# Patient Record
Sex: MALE | URBAN - METROPOLITAN AREA
[De-identification: names, ages, dates, MRNs, and addresses within clinical notes are randomized per-mention and may not be internally consistent; named-entity substitution may affect disease eponyms.]

---

## 2024-01-10 DIAGNOSIS — B20 HUMAN IMMUNODEFICIENCY VIRUS (HIV) DISEASE: Primary | ICD-10-CM

## 2024-01-11 ENCOUNTER — TELEPHONE (OUTPATIENT)
Dept: INFECTIOUS DISEASES | Facility: CLINIC | Age: 39
End: 2024-01-11
Payer: MEDICAID

## 2024-01-11 NOTE — LETTER
Fax Transmission                                                                                                                                                       Date: January 11, 2024       To:  Chelle [facility] From: Azeb Veras LPN             HIV Care Coordinator    Fax: [phone]  Fax: [phone]   Phone: [phone]  Phone: [phone]     Special Instructions:     Chelle,   I have scheduled the intake appointment for Terry Puentes, as you requested.   His appointment is January 23, 2024 @ 9:00 am at:     [facility]   Specialty Care Clinic, 4th floor  [address] [place] [address]    If you need to cancel or reschedule this appointment for any reason, just contact me and I will be happy to work with you to accommodate this patient.    For questions or issues, please contact department listed on attached report.                            IF THERE ARE ANY PROBLEMS WITH THIS TRANSMISSION, PLEASE CALL IMMEDIATELY. THANK YOU    CONFIDENTIALITY NOTICE: The accompanying facsimile is intended solely for the use of the recipient designated above. Document(s) transmitted herewith may contain information that is confidential and privileged. Delivery, distribution of dissemination of this communication other than to the intended recipient is strictly prohibited. If you are another healthcare provider and have received this facsimile in error, please properly dispose and notify the sender. If you are NOT a healthcare provider and have received this facsimile in error, please notify [facility]s Compliance & Privacy Department immediately by email at [email or web address]

## 2024-01-11 NOTE — TELEPHONE ENCOUNTER
I received a fax from Chelle Hurt with [place] [employer] [facility] requesting an appt for Terry Puentes who is being released from [facility] on 1/15/24. She is requesting an intake appt for this pt to give to him prior to his release. Appt info left on her voicemail and faxed to her, as well with my contact info to reach me if there are any questions or if she needs further assistance.     Appt scheduled 1/23/2024 @ 9:00a.

## 2024-01-30 ENCOUNTER — DOCUMENTATION ONLY (OUTPATIENT)
Dept: INFECTIOUS DISEASES | Facility: CLINIC | Age: 39
End: 2024-01-30
Payer: MEDICAID

## 2024-01-30 NOTE — LETTER
February 2, 2024             [facility]  [address]  [place] [address]  Phone: [phone] To: [facility]        Office of Public Health          On 1/10/24, I received a referral from Chelle Hurt, requesting an intake for this inmate who was scheduled to be released on 1/15/24.   On 1/11/24, I scheduled the intake appointment for 1/23/24 @ 09:00. I attempted to call Chelle with the appointment information, but was unsuccessful in doing so, as there was no answer and her voicemail box was full. I faxed the information to her as she requested on the referral that was sent. I did receive a successful transmission of this fax. On 1/23/24, The patient did not show up for his intake appointment. I have tried to call Chelle twice daily since 1/23/24 in hopes of obtaining contact info on this patient, to no avail, no answer and voicemail box was full.   On 1/30/24, I reviewed his records in [facility]'s Care Everywhere and found that he had a telemed visit with half-way physician, Dr. Dg Silva, on 1/4/2024 @ 09:30 where he was given Triumeq with a quantity of 30 with 11 refills. I called the [facility], contact info on Chelle Blu's fax cover sheet, and spoke with the  who stated that she would contact Chelle via high priority email and have her call me. Today, 2/2/2024, I still have not heard from her.     If I can be of further assistance, please do not hesitate to contact me.       Azeb Veras LPN  Specialty Care Clinic  HIV Care Coordinator    **Referral cancelled

## 2024-01-30 NOTE — LETTER
January 30, 2024    Terry Puentes  [address]  [facility] [address]             [facility]  [address] [address]  Phone: [phone]

## 2024-02-02 NOTE — PROGRESS NOTES
1/10/24, I received a referral from Chelle Hurt requesting an intake for this inmate who was scheduled to be released on 1/15/24.   1/11/24, I scheduled the intake appointment for 1/23/24 @ 9:00. I attempted to call Chelle with the appointment information, but was unsuccessful in doing so, as there was no answer and her voicemail box was full. I faxed the information to her as she requested on the referral that was sent. I did receive a successful transmission of this fax.   1/23/24, The patient did not show up for his intake appointment. I have tried to call Chelle twice daily since 1/23/24 in hopes of obtaining contact info on this patient, to no avail, no answer and voicemail box was full.   1/30/24, I reviewed his records in [facility]'s Care Everywhere and found that he had a telemed visit with a shelter physicial, Dr. Dg Silva, on 1/4/2024 @ 09:30 where he was given Triumeq with a quantity of 30 with 11 refills. I called the [facility] Department of Health, contact on Chelle Hurt's fax cover sheet, and spoke with the  who stated that she would contact Chelle via high priority email and have her call me. Today, 2/2/2024, I still have not heard from her.   **Referral cancelled.

## 2024-02-02 NOTE — PROGRESS NOTES
2/2/2024 Letter mailed to:  [place] [facility], STD/HIV/Hepatitis Program  [address]  [place] [address]      On 1/10/24, I received a referral from Chelle Hurt, requesting an intake for this inmate who was scheduled to be released on 1/15/24.   On 1/11/24, I scheduled the intake appointment for 1/23/24 @ 09:00. I attempted to call Chelle with the appointment information, but was unsuccessful in doing so, as there was no answer and her voicemail box was full. I faxed the information to her as she requested on the referral that was sent. I did receive a successful transmission of this fax. On 1/23/24, The patient did not show up for his intake appointment. I have tried to call Chelle twice daily since 1/23/24 in hopes of obtaining contact info on this patient, to no avail, no answer and voicemail box was full.   On 1/30/24, I reviewed his records in [facility]'s Care Everywhere and found that he had a telemed visit with [facility] physician, Dr. Dg Silva, on 1/4/2024 @ 09:30 where he was given Triumeq with a quantity of 30 with 11 refills. I called the [facility] Department of Health, contact info on Chelle Hurt's fax cover sheet, and spoke with the  who stated that she would contact Chelle via high priority email and have her call me. Today, 2/2/2024, I still have not heard from her.   **Referral cancelled.